# Patient Record
Sex: FEMALE | Race: BLACK OR AFRICAN AMERICAN | Employment: FULL TIME | URBAN - METROPOLITAN AREA
[De-identification: names, ages, dates, MRNs, and addresses within clinical notes are randomized per-mention and may not be internally consistent; named-entity substitution may affect disease eponyms.]

---

## 2019-04-25 ENCOUNTER — HOSPITAL ENCOUNTER (OUTPATIENT)
Age: 24
Discharge: HOME | End: 2019-04-25
Attending: FAMILY MEDICINE
Payer: COMMERCIAL

## 2019-04-25 ENCOUNTER — APPOINTMENT (OUTPATIENT)
Dept: GENERAL RADIOLOGY | Age: 24
End: 2019-04-25
Attending: FAMILY MEDICINE
Payer: COMMERCIAL

## 2019-04-25 VITALS
HEIGHT: 62 IN | SYSTOLIC BLOOD PRESSURE: 124 MMHG | BODY MASS INDEX: 31.28 KG/M2 | OXYGEN SATURATION: 100 % | HEART RATE: 68 BPM | DIASTOLIC BLOOD PRESSURE: 78 MMHG | TEMPERATURE: 99 F | RESPIRATION RATE: 20 BRPM | WEIGHT: 170 LBS

## 2019-04-25 DIAGNOSIS — M25.561 ACUTE PAIN OF RIGHT KNEE: Primary | ICD-10-CM

## 2019-04-25 PROCEDURE — 99203 OFFICE O/P NEW LOW 30 MIN: CPT

## 2019-04-25 PROCEDURE — 73560 X-RAY EXAM OF KNEE 1 OR 2: CPT | Performed by: FAMILY MEDICINE

## 2019-04-25 RX ORDER — NORETHINDRONE ACETATE AND ETHINYL ESTRADIOL 1; .02 MG/1; MG/1
TABLET ORAL
Refills: 3 | COMMUNITY
Start: 2019-04-19

## 2019-04-25 NOTE — ED INITIAL ASSESSMENT (HPI)
Pt states on Sunday had a fall coming off a curb. Pt states her knee went to the side and she heard a popping sound. Pt states has been having knee pain ever since and also having difficulty bending knee.

## 2019-04-25 NOTE — ED PROVIDER NOTES
Patient Seen in: [address]    History   Patient presents with:  Lower Extremity Injury (musculoskeletal)    Stated Complaint: rt knee pain    HPI  21yo F with no sig PMHx presents to IC with 4 days of right knee pain. sounds normal.   Musculoskeletal:        Right knee: She exhibits decreased range of motion (pain with flexion > 30 degrees).  She exhibits no swelling, no effusion, no ecchymosis, no deformity, no laceration, no erythema, normal alignment, normal patellar until cleared by Ortho. She verbalized agreement and understanding.            Disposition and Plan     Clinical Impression:  Acute pain of right knee  (primary encounter diagnosis)    Disposition:  Discharge  4/25/2019 10:17 am    Follow-up:  [facility]

## 2019-06-12 ENCOUNTER — HOSPITAL ENCOUNTER (OUTPATIENT)
Age: 24
Discharge: HOME | End: 2019-06-12
Attending: FAMILY MEDICINE
Payer: COMMERCIAL

## 2019-06-12 VITALS
BODY MASS INDEX: 31.28 KG/M2 | WEIGHT: 170 LBS | HEART RATE: 82 BPM | HEIGHT: 62 IN | OXYGEN SATURATION: 99 % | SYSTOLIC BLOOD PRESSURE: 121 MMHG | RESPIRATION RATE: 20 BRPM | DIASTOLIC BLOOD PRESSURE: 85 MMHG | TEMPERATURE: 99 F

## 2019-06-12 DIAGNOSIS — N76.0 ACUTE VAGINITIS: Primary | ICD-10-CM

## 2019-06-12 PROCEDURE — 87106 FUNGI IDENTIFICATION YEAST: CPT | Performed by: FAMILY MEDICINE

## 2019-06-12 PROCEDURE — 99214 OFFICE O/P EST MOD 30 MIN: CPT

## 2019-06-12 PROCEDURE — 87808 TRICHOMONAS ASSAY W/OPTIC: CPT | Performed by: FAMILY MEDICINE

## 2019-06-12 PROCEDURE — 87205 SMEAR GRAM STAIN: CPT | Performed by: FAMILY MEDICINE

## 2019-06-12 PROCEDURE — 87491 CHLMYD TRACH DNA AMP PROBE: CPT | Performed by: FAMILY MEDICINE

## 2019-06-12 PROCEDURE — 87591 N.GONORRHOEAE DNA AMP PROB: CPT | Performed by: FAMILY MEDICINE

## 2019-06-12 NOTE — ED INITIAL ASSESSMENT (HPI)
Pt states missing four days of her birth control, pt spoke to obgyn told she should restart and that she may have some break through bleeding. Pt states that happened and then got her period but as been bleeding on and off.

## 2019-06-12 NOTE — ED PROVIDER NOTES
Patient Seen in: [address]    History   Patient presents with:  Eval-G (genital)    Stated Complaint: Vaginal Bleeding     HPI    70-year-old female presents with vaginal discharge and intermittent bleeding for 1 week. Vagina normal and uterus normal. Pelvic exam was performed with patient supine. There is no rash or tenderness on the right labia. Cervix exhibits no motion tenderness. Right adnexum displays no mass and no tenderness.  Left adnexum displays no mass and no

## 2019-06-15 NOTE — ED NOTES
S/w patient. +BV. Physician prescribed metronidazole 50 mg BID x 7 days. Medication called in to pharmacy. Pt verbalized understanding and agreed.

## 2020-05-10 NOTE — ED INITIAL ASSESSMENT (HPI)
patient brought in by EMS, boyfriend called because he was sent a text saying \"goodbye\" and that pt had taken 4 valium pills.  Patient states she took 2 valium pills because she wanted to \"get some rest.\" Unknown dose of valium and valium prescription b

## 2020-05-10 NOTE — ED PROVIDER NOTES
Patient Seen in: [facility] AND [facility] Emergency Department    History   Patient presents with:  Eval-P    Stated Complaint:     HPI    Patient complains of beign upset after argument with mom. States she left to go to hotel to relax.   Took 2 valium to rela no masses, nl bowel sounds   EXTREMITIES: from, 5/5 strength in all 4 ext, no edema  NEURO: alert and oiented *3, 2-12 intact, no focal deficit noted  SKIN: good skin turgor, no  rashes  PSYCH: calm, cooperative, denies si        ED Course   Labs Reviewed

## 2020-05-10 NOTE — ED NOTES
Spoke with pt's sister Sylwia for collateral. It was relayed to ED staff that pt's sister called 911.